# Patient Record
Sex: MALE | Race: WHITE | NOT HISPANIC OR LATINO | Employment: OTHER | URBAN - METROPOLITAN AREA
[De-identification: names, ages, dates, MRNs, and addresses within clinical notes are randomized per-mention and may not be internally consistent; named-entity substitution may affect disease eponyms.]

---

## 2017-11-15 ENCOUNTER — HOSPITAL ENCOUNTER (OUTPATIENT)
Dept: SPEECH THERAPY | Facility: HOSPITAL | Age: 72
Setting detail: THERAPIES SERIES
Discharge: HOME | End: 2017-11-15

## 2017-11-15 ENCOUNTER — HOSPITAL ENCOUNTER (OUTPATIENT)
Dept: PHYSICAL THERAPY | Facility: HOSPITAL | Age: 72
Setting detail: THERAPIES SERIES
Discharge: HOME | End: 2017-11-15

## 2017-11-15 DIAGNOSIS — I69.919 COGNITIVE DEFICITS AS LATE EFFECT OF CEREBROVASCULAR DISEASE: Primary | ICD-10-CM

## 2017-11-15 DIAGNOSIS — I63.9 CEREBROVASCULAR ACCIDENT (CVA), UNSPECIFIED MECHANISM (HCC): Primary | ICD-10-CM

## 2017-11-15 PROCEDURE — 96125 COGNITIVE TEST BY HC PRO: CPT

## 2017-11-15 PROCEDURE — G8978 MOBILITY CURRENT STATUS: HCPCS | Performed by: PHYSICAL THERAPIST

## 2017-11-15 PROCEDURE — G9170 MEMORY D/C STATUS: HCPCS

## 2017-11-15 PROCEDURE — G8979 MOBILITY GOAL STATUS: HCPCS | Performed by: PHYSICAL THERAPIST

## 2017-11-15 PROCEDURE — G9169 MEMORY GOAL STATUS: HCPCS

## 2017-11-15 PROCEDURE — G9168 MEMORY CURRENT STATUS: HCPCS

## 2017-11-15 PROCEDURE — 97162 PT EVAL MOD COMPLEX 30 MIN: CPT | Performed by: PHYSICAL THERAPIST

## 2017-11-15 NOTE — THERAPY EVALUATION
Outpatient Physical Therapy Ortho Initial Evaluation  SUSHANT Carrera     Patient Name: Blas Hairston  : 1945  MRN: [id number]  Today's Date: 11/15/2017      Visit Date: 11/15/2017    There is no problem list on file for this patient.       Past Medical History:   Diagnosis Date   • Coronary artery disease 10/25/2017    Left internal carotid artery stenosis 70%; Right internal carotid artery stenosis 50%   • Diabetes mellitus    • Gout 10/25/2017    LLE   • Hyperlipidemia 10/25/2017   • Hypertension    • PAD (peripheral artery disease)     with bilateral illiac stents   • Polyp of ethmoid sinus 10/25/2017    Ethmoid/sphenoid soft tissue mass; evaluated w/biopsy by [facility] per patient; f/u with ENT to follow as OP   • Stroke 10/25/2017    Superior left cerebellar infarct        Past Surgical History:   Procedure Laterality Date   • VASCULAR SURGERY Bilateral     illiac stents       Visit Dx:     ICD-10-CM ICD-9-CM   1. Cerebrovascular accident (CVA), unspecified mechanism I63.9 434.91             Patient History       11/15/17 0900          History    Chief Complaint Balance Problems  -SP      Date Current Problem(s) Began 10/25/17  -SP      Brief Description of Current Complaint 10-; Patient reports that he woke and had headache behind left eye and he went back to bed.  When he got up he observed slurred speech and had some difficulty with gait.  He initially went to [facility] then to [facility], and from there went to [facility] on 2017 to 2017 for rehab.  Patient has been home with spouse and he reports that he is doing well, using SPC for community ambulation only.   Patient reports that he feels that he is getting stronger and he has had no falls at home.  He states that he has to be cautious with intial stand.  Patient states he is doing exercises at home prescribed at [facility].    -SP      Previous treatment for THIS PROBLEM Rehabilitation  -SP      Onset Date- PT  11/15/2017 outpatient PT  -SP      Patient/Caregiver Goals Return to prior level of function  -SP      Smoking Status has not smoked since [facility] 10-  -SP      Patient's Rating of General Health Good  -SP      Hand Dominance right-handed  -SP      Occupation/sports/leisure activities retired, lives with spouse in single level home; can enter through 1 or 2/3 step entrance.  Enjoys yard work, mowing   -SP      How has patient tried to help current problem? patient has been performing HEP given at [facility]  -SP      Pain     Pain Location --   no complaints of pain  -SP      What Performance Factors Make the Current Problem(s) WORSE? Patient with mild unsteadiness with transfers to stand; some difficulty tolerating prolonged weight bearing and ambulation  -SP      What Performance Factors Make the Current Problem(s) BETTER? Patient reports he is cautious with mobility especially on unlevel surfaces and uses a cane for safety  -SP      Tolerance Time- Standing tolerates standing for showers but reports he fatiques and plans to obtain a shower seat  -SP      Tolerance Time- Sitting unlimited  -SP      Tolerance Time- Walking tolerates household distances and short community distances  -SP      Is your sleep disturbed? No  -SP      Difficulties with ADL's? Patient with some difficulty tolerating prolonged stand for shower; some difficulty with balance upon immediate stand and with prolonged ambulation  -SP      Fall Risk Assessment    Any falls in the past year: No  -SP      Daily Activities    Primary Language English  -SP      How does patient learn best? Listening;Pictures/Video  -SP      Teaching needs identified Management of Condition;Home Exercise Program;Falls Prevention  -SP      Patient is concerned about/has problems with Performing home management (household chores, shopping, care of dependents);Performing job responsibilities/community activities (work, school,;Walking;Transfers (getting out of a chair,  bed)  -SP      Does patient have problems with the following? None  -SP      Barriers to learning None  -SP      Pt Participated in POC and Goals Yes  -SP      Safety    Are you being hurt, hit, or frightened by anyone at home or in your life? No  -SP      Are you being neglected by a caregiver No  -SP        User Key  (r) = Recorded By, (t) = Taken By, (c) = Cosigned By    Initials Name Provider Type    SP Gissell Bryson, PT Physical Therapist                PT Ortho       11/15/17 0900    Left Hip    Hip Flexion Gross Movement (4-/5) good minus  -SP    Hip Extension Gross Movement (3+/5) fair plus  -SP    Hip ABduction Gross Movement (4-/5) good minus  -SP    Hip ADduction Gross Movement (4-/5) good minus  -SP    Right Hip    Hip Flexion Gross Movement (4/5) good  -SP    Hip Extension Gross Movement (4/5) good  -SP    Hip ABduction Gross Movement (4/5) good  -SP    Hip ADduction Gross Movement (4/5) good  -SP    Left Knee    Knee Extension Gross Movement (4-/5) good minus  -SP    Knee Flexion Gross Movement (4-/5) good minus  -SP    Right Knee    Knee Extension Gross Movement (5/5) normal  -SP    Knee Flexion Gross Movement (5/5) normal  -SP    Left Ankle/Foot    Ankle PF Gross Movement (3+/5) fair plus  -SP    Ankle Dorsiflexion Gross Movement (4-/5) good minus  -SP    Right Ankle/Foot    Ankle PF Gross Movement (4/5) good  -SP    Ankle Dorsiflexion Gross Movement (4/5) good  -SP    Flexibility    Flexibility Tested? Lower Extremity  -SP    Lower Extremity Flexibility    Hamstrings Moderately limited;Bilateral:  -SP    Hip Flexors Bilateral:;Moderately limited  -SP    Hip External Rotators Bilateral:;Moderately limited  -SP    Hip Internal Rotators Bilateral:;Moderately limited  -SP    Balance Skills Training    Sitting-Level of Assistance Independent  -SP    Standing-Level of Assistance Independent  -SP    Gait Balance-Level of Assistance Independent  -SP    Gait Balance Support assistive device  -SP     "Gait Balance Activities backwards;side-stepping;stepping over object   patient requires hold to support  -SP    [place] patient able to stand with feet together for 30 seconds with eyes open and closed: Able to stand with feet in \"step\" partial tandem for 9 seconds (with both right foot and left foot forward) with eyes open:  Unable to stand with feet in tandem with eyes closed without support.  -SP    Balance Comments Patient with mild unsteadiness observed upon initial stand  -SP    Transfers    Transfers, Sit-Stand [place] independent   uses UEs to assist  -SP    Transfer, Comment Patient with independent transfers supine/sit  -SP    Gait Assessment/Treatment    Gait, [place] Level independent  -SP    Gait, Assistive Device straight cane  -SP    Gait, Gait Pattern Analysis swing-through gait  -SP    Gait, Gait Deviations elian decreased;decreased heel strike;forward flexed posture;leans to the right;step length decreased;toe-to-floor clearance decreased   intermittent decreased toe/floor clearance on (L)  -SP    Gait, Safety Issues balance decreased during turns;step length decreased  -SP    Gait, Impairments impaired balance  -SP    Stairs Assessment/Treatment    Stairs, [place] Level supervision required  -SP    Stairs, Assistive Device straight cane   or (B) rails  -SP    Stairs, Technique Used --   intermittant step to/step over with ascend and descend  -SP    Stairs, Comment requires (B) UE support for safety  -SP      User Key  (r) = Recorded By, (t) = Taken By, (c) = Cosigned By    Initials Name Provider Type    SP Gissell Bryson, PT Physical Therapist                PT Neuro       11/15/17 0900          Pain Assessment    Pain Assessment No/denies pain  -SP      Home Living    Living Arrangements house  -SP      Home Accessibility bed and bath on same level;stairs to enter home  -SP      Number of Stairs to Enter Home --   2 entrances: single step and 2-3 step  -SP      Living " Environment Comment --   Pt states he is only using cane for community ambulation  -SP      Vision-Basic Assessment    Current Vision No visual deficits  -SP      Cognition    Overall Cognitive Status WFL  -SP      Comments Patient assessed by speech therapist and does not require further treatment   -SP      Sensation    Sensation WNL? WFL  -SP      Perception    Perception WNL? WFL  -SP      Posture/Observations    Forward Head Mild  -SP      Scapular Elevation Bilateral:;Mild  -SP      Lumbar lordosis Decreased  -SP      Posture/Observations Comments stands with decreased weight bearing on left LE  -SP      Coordination    Rapid Alternating Left:;Impaired   with forearm sup/forearm and toe taps: mild deficits  -SP      Finger to Nose Eyes Open Left:;Impaired   mild  -SP      Finger to Nose Eyes Closed Left:;Impaired   mild, slow   -SP      Dexterity Left:;Impaired   mild impairement with heel to shin  -SP      General UE Assessment    ROM LUE ROM was WFL   (R) shoulder flexion/abd limited: 130 degrees; otherwis wfl  -SP      General LE Assessment    ROM LLE ROM was WFL;RLE ROM was WFL  -SP      Trunk    Trunk Flexion Gross Movement (3-/5) fair minus  -SP      Left Trunk Rotat Int & Ext Abd Obliques (2/5) poor  -SP      Right Trunk Rotat Int & Ext Abd Obliques (2/5) poor  -SP      Gait Assessment/Treatment    Gait, Comment ambulate serpentine/around obstacles with wide deviation and step to pattern, with SPC and CGA to SBA.  Ambulates over obstacles and step without rail with SPC and CGA with some discontinuity but without LOB  -SP        User Key  (r) = Recorded By, (t) = Taken By, (c) = Cosigned By    Initials Name Provider Type    SP Gissell Bryson, PT Physical Therapist                        Therapy Education       11/15/17 3069          Therapy Education    Education Details Discussed safety with gait and use of assistive device appropriate for situation and surfaces.   Discussed plan of treatment  with patient.  -SP      Program New  -SP      How Provided Verbal  -SP      Provided to Patient  -SP      Level of Understanding Verbalized  -SP        User Key  (r) = Recorded By, (t) = Taken By, (c) = Cosigned By    Initials Name Provider Type    MARIAJOSE Bryson, PT Physical Therapist                PT OP Goals       11/15/17 0900       PT Short Term Goals    STG 1 Patient demonstrates no unsteadiness upon initial stand without assistive device with >50%% of transfers during treament session  -SP     STG 2 Patient ambulates stairs with hold to single rail safely with recipriocal gait patten   -SP     STG 3 Patient ambulates level surfaces without least restrictive device with equal step length and heel strike without deviations from path.  -SP     STG 4 Patient negociates serpentine path/obstacles safely and independently without significant compensations or deviation from path  -SP     Long Term Goals    LTG 1 Patient demonstrates improved transfer and gait skills/decreased fall risk with decreased timed up and go test to <18 sec  -SP     LTG 2 Patient demonstrates safe and independent ambulation with least restrictive device on level and unlevel surface with equal weight bearing, no deviations or compensation > 500 feet  -SP     LTG 3 Patient independent with HEP  -SP     Time Calculation    PT Goal Re-Cert Due Date 12/15/17  -SP       User Key  (r) = Recorded By, (t) = Taken By, (c) = Cosigned By    Initials Name Provider Type    MARIAJOSE Bryson, PT Physical Therapist                PT Assessment/Plan       11/15/17 1713 11/15/17 1654    PT Assessment    Assessment Comments  Patient is s/p CVA 10- with residual left side weakness.  He presents with weakness, balance deficits, altered gait, and decreased tolerance for ADLs.  -SP    Please refer to paper survey for additional self-reported information  Yes  -SP    Rehab Potential  Good  -SP    Patient/caregiver participated in  establishment of treatment plan and goals Yes  -SP     Patient would benefit from skilled therapy intervention  Yes  -SP    PT Plan    PT Frequency  2x/week  -SP    Predicted Duration of Therapy Intervention (days/wks) 4-6 weeks  -SP 4-6 weeks  -SP    Planned CPT's? PT EVAL MOD COMPLELITY: 79794;PT THER PROC EA 15 MIN: 53730;PT MANUAL THERAPY EA 15 MIN: 57807;PT GAIT TRAINING EA 15 MIN: 39075;PT NEUROMUSC RE-EDUCATION EA 15 MIN: 35323;PT ELECTRICAL STIM UNATTEND:   -SP       11/15/17 1627       PT Assessment    Functional Limitations Impaired gait;Decreased safety during functional activities;Impaired locomotion;Limitation in home management;Limitations in community activities;Limitations in functional capacity and performance  -SP     Impairments Balance;Coordination;Endurance;Gait;Muscle strength  -SP       User Key  (r) = Recorded By, (t) = Taken By, (c) = Cosigned By    Initials Name Provider Type    SP Gissell Bryson, PT Physical Therapist                                    Outcome Measures       11/15/17 1500          Lower Extremity Functional Index    Any of your usual work, housework or school activities 2  -SP      Your usual hobbies, recreational or sporting activities 3  -SP      Getting into or out of the bath 2  -SP      Walking between rooms 3  -SP      Putting on your shoes or socks 4  -SP      Squatting 4  -SP      Lifting an object, like a bag of groceries from the floor 4  -SP      Performing light activities around your home 3  -SP      Performing heavy activities around your home 1  -SP      Getting into or out of a car 3  -SP      Walking 2 blocks 2  -SP      Walking a mile 2  -SP      Going up or down 10 stairs (about 1 flight of stairs) 2  -SP      Standing for 1 hour 1  -SP      Sitting for 1 hour 1  -SP      Running on even ground 1  -SP      Running on uneven ground 1  -SP      Making sharp turns while running fast 1  -SP      Hopping 0  -SP      Rolling over in bed 4  -SP    "   Total 44  -SP      Tinetti Assessment    Sitting Balance 1  -SP      Arises 1  -SP      Attempts to Rise 2  -SP      Immediate Standing Balance (first 5 sec) 0  -SP      Standing Balance 1  -SP      Sternal Nudge (feet close together) 2  -SP      Eyes Closed (feet close together) 1  -SP      Turning 360 Degrees- Steps 0  -SP      Turning 360 Degrees- Steadiness 1  -SP      Sitting Down 1  -SP      Tinetti Balance Score 10  -SP      Gait Initiation (immediate after told \"go\") 1  -SP      Step Length- Right Swing 1  -SP      Step Length- Left Swing 1  -SP      Foot Clearance- Right Foot 1  -SP      Foot Clearance- Left Foot 0   inconsistent  -SP      Step Symmetry 0  -SP      Step Continuity 1  -SP      Path (excursion) 1  -SP      Trunk 0  -SP      Base of Support 0  -SP      Gait Score 6  -SP      Tinetti Total Score 16  -SP      Tinetti Assistive Device straight cane  -SP      Timed Up and Go (TUG)    TUG Test 1 19.68 seconds   without AD  -SP      TUG Test 2 20.98 seconds   without assistive device  -SP      Timed Up and Go Comments 20.46/ 20.14 sec   with SPC  -SP        User Key  (r) = Recorded By, (t) = Taken By, (c) = Cosigned By    Initials Name Provider Type    SP Gissell Bryson, PT Physical Therapist            Time Calculation:   Start Time: 0930  Stop Time: 1045  Time Calculation (min): 75 min     Therapy Charges for Today     Code Description Service Date Service Provider Modifiers Qty    63896091620 HC PT MOBILITY CURRENT 11/15/2017 Gissell Bryson, PT GP, CL 1    14264459319 HC PT MOBILITY PROJECTED 11/15/2017 Gissell Bryson, PT GP, CI 1    54475348574 HC PT EVAL MOD COMPLEXITY 4 11/15/2017 Gissell Bryson, PT GP 1          PT G-Codes  PT Professional Judgement Used?: Yes  Outcome Measure Options: Lower Extremity Functional Scale (LEFS), Timed Up and Go (TUG), Tinetti  Mobility: Walking and Moving Around Current Status (): At least 60 percent but less than 80 " percent impaired, limited or restricted  Mobility: Walking and Moving Around Goal Status (): At least 1 percent but less than 20 percent impaired, limited or restricted         Gissell Bryson, PT  11/15/2017

## 2017-11-15 NOTE — THERAPY DISCHARGE NOTE
Outpatient Speech Language Pathology   Adult Speech Language Cognitive Eval/Discharge   Stephanie Barrett     Patient Name: Blas Hairston  : 1945  MRN: [id number]  Today's Date: 11/15/2017         Visit Date: 11/15/2017    There is no problem list on file for this patient.       Past Medical History:   Diagnosis Date   • Coronary artery disease 10/25/2017    Left internal carotid artery stenosis 70%; Right internal carotid artery stenosis 50%   • Diabetes mellitus    • Gout 10/25/2017    LLE   • Hyperlipidemia 10/25/2017   • Hypertension    • PAD (peripheral artery disease)     with bilateral illiac stents   • Polyp of ethmoid sinus 10/25/2017    Ethmoid/sphenoid soft tissue mass; evaluated w/biopsy by [facility] per patient; f/u with ENT to follow as OP   • Stroke 10/25/2017    Superior left cerebellar infarct        Past Surgical History:   Procedure Laterality Date   • VASCULAR SURGERY Bilateral     illiac stents         Visit Dx:    ICD-10-CM ICD-9-CM   1. Cognitive deficits as late effect of cerebrovascular disease I69.919 438.0             Adult Speech Language - 11/15/17 1100     Background and History    Reason for Referral Mild cognitive deficits following left cerebellar CVA on 10/25/17.   -AD    Stated Goals Pt does not have any goals at this time regarding his cognition and memory.   -AD    Description of Complaint Pt states he doesn't feel he is having any issues related to his thinking or memory. He states they recommended the therapy at [facility] and he felt it was part of the program and inagreement with it.   -AD    Previous Functional Status Functional in all spheres   per report from patient and his wife.   -AD    Current Baseline Abilities Pt reports he is taking care of all things at home except the bills as his wife has always taken care of this prior to this CVA.   -AD    Pertinent Medications clopidogrel 75 mg once daily  -AD    Primary Language in the Home English  -AD    Primary Caregiver  Spouse;Other (comment)   self  -AD    Informant for the Evaluation Self  -AD    CLQT (The Cognitive Linguistic Quick Test)    Attention Domain Score 179  -AD    Attention Severity Rating 4: WNL  -AD    Memory Domain Score 175  -AD    Memory Severity Rating 4: WNL  -AD    Executive Function Domain Score 20  -AD    Executive Function Severity Rating 4: WNL  -AD    Language Domain Score 32  -AD    Language Severity Rating 4: WNL  -AD    Visuospatial Domain Score 78  -AD    Visuospatial Severity Rating 4: WNL  -AD    Clock Drawing Total Score 11  -AD    Clock Drawing Severity Rating WNL  -AD    Composite Severity Rating 4  -AD    Composite Severity Rating Range 4.0 - 3.5: WNL  -AD    CLQT Comments Mr. Hairston demonstrates cognitive skills within normal limits as compared to his same age peers of 69-89 years of age. He demonstrated some minimal errors on the exam related to recognition of errors (only re: time on the clock draw relating to the time), self corrections on the symbol trail subtest, increased time needed to complete the mazes felt to be due to decreased planning, and design generation with his criterion score lower than his same age peers (4 with criterion at 5) due to decreased planning and mental flexibility. Mr. Hairston demonstrates awareness of his errors and will correct. He demonstrates the need for increased time to perform activities and falls within the functional range for his current cognitive abilities. No concerns have been noted at home [facility] and he feels as if he is at his baseline functioning at this time.   -AD      User Key  (r) = Recorded By, (t) = Taken By, (c) = Cosigned By    Initials Name Provider Type    KADEEM Coelho MS [facility]-SLP Speech Therapist      No concerns with motor speech or receptive/expressive language skills. WFL per clinical observation.          OP SLP Education       11/15/17 1033    Education    Barriers to Learning No barriers  identified  -AD    Education Provided Described results of evaluation;Patient expressed understanding of evaluation  -AD    Assessed Learning needs;Learning motivation;Learning preferences;Learning readiness  -AD    Learning Motivation Strong  -AD    Learning Method Explanation;Demonstration  -AD    Teaching Response Verbalized understanding  -AD    Education Comments Pt provided with information for cognitive exercises to be performed at home. SLP suggested 30 minutes per day practice with varied activities to prevent cognitive decline. Pt verbalized understandig.   -AD      User Key  (r) = Recorded By, (t) = Taken By, (c) = Cosigned By    Initials Name Effective Dates    KADEEM Coelho MS CCC-SLP 06/22/16 -               OP SLP Assessment/Plan - 11/15/17 1145     SLP Assessment    Clinical Impression: Speech Language-Adult/Congnition Speech Language WFL;Cognitive Communication WFL  -AD    Please refer to paper survey for additional self-reported information Yes  -AD    Please refer to items scanned into chart for additional diagnostic informaiton and handouts as provided by clinician Yes  -AD    SLP Diagnosis Functional cognitive communication skills  -AD    Prognosis Good (comment)   For return to prior level of functioning without further intervention at this time.   -AD    Patient/caregiver participated in establishment of treatment plan and goals Yes   Agree that no further therapy is indicated at this time.   -AD    Patient would benefit from skilled therapy intervention No  -AD    SLP Plan    Frequency Evaluation only  -AD    Planned CPT's? SLP COG TEST (PER HOUR): 06377  -AD    Plan Comments No further therapy is indicated at this time.   -AD      User Key  (r) = Recorded By, (t) = Taken By, (c) = Cosigned By    Initials Name Provider Type    KADEEM Coelho MS CCC-SLP Speech Therapist         Time Calculation:   SLP Start Time: 0830  SLP Stop Time: 0945 (this includes scoring and  interpretation time.)  SLP Time Calculation (min): 75 min  SLP Non-Billable Time (min): 0 min  Total Timed Code Minutes- SLP: 75 minute(s)    Therapy Charges for Today     Code Description Service Date Service Provider Modifiers Qty    38303537178 HC ST MEMORY CURRENT 11/15/2017 Yvonne Coelho, MS CCC-SLP GN, CH 1    03808389921 HC ST MEMORY PROJECTED 11/15/2017 Yvonne Coelho MS CCC-SLP GN, CH 1    28219505399 HC ST MEMORY DISCHARGE 11/15/2017 Yvonne Coelho, MS CCC-SLP GN, CH 1    03038181615 HC ST STD COG PERF TEST PER HOUR 11/15/2017 Yvonne Coelho MS CCC-SLP GN 1          SLP G-Codes  SLP NOMS Used?: Yes  Functional Limitations: Memory  Memory Current Status (): 0 percent impaired, limited or restricted  Memory Goal Status (): 0 percent impaired, limited or restricted  Memory Discharge Status (): 0 percent impaired, limited or restricted    Discharge from therapy with no further follow up.     Yvonne Coelho MS CCC-SLP  11/15/2017

## 2017-11-21 ENCOUNTER — HOSPITAL ENCOUNTER (OUTPATIENT)
Dept: PHYSICAL THERAPY | Facility: HOSPITAL | Age: 72
Setting detail: THERAPIES SERIES
Discharge: HOME | End: 2017-11-21

## 2017-11-21 PROCEDURE — 97110 THERAPEUTIC EXERCISES: CPT | Performed by: PHYSICAL THERAPIST

## 2017-11-21 NOTE — THERAPY TREATMENT NOTE
Outpatient Physical Therapy Ortho Treatment Note  SUSHANT Carrera     Patient Name: Blas Hairston  : 1945  MRN: [id number]  Today's Date: 2017      Visit Date: 2017    Visit Dx:  No diagnosis found.    There is no problem list on file for this patient.       Past Medical History:   Diagnosis Date   • Coronary artery disease 10/25/2017    Left internal carotid artery stenosis 70%; Right internal carotid artery stenosis 50%   • Diabetes mellitus    • Gout 10/25/2017    LLE   • Hyperlipidemia 10/25/2017   • Hypertension    • PAD (peripheral artery disease)     with bilateral illiac stents   • Polyp of ethmoid sinus 10/25/2017    Ethmoid/sphenoid soft tissue mass; evaluated w/biopsy by [facility] per patient; f/u with ENT to follow as OP   • Stroke 10/25/2017    Superior left cerebellar infarct        Past Surgical History:   Procedure Laterality Date   • VASCULAR SURGERY Bilateral     illiac stents             PT Ortho       17 1100    Subjective Comments    Subjective Comments Patient reports that he has been doing exercises as previously prescribed at home and continues to feel like he is doing well  -SP      User Key  (r) = Recorded By, (t) = Taken By, (c) = Cosigned By    Initials Name Provider Type    MARIAJOSE Bryson, PT Physical Therapist                            PT Assessment/Plan       17 6513       PT Assessment    Assessment Comments Patient tolerates ther-ex and balance activity well requiring only one rest break.  Exhibits fatigue after Rx  -SP     PT Plan    PT Plan Comments Continue per POC  -SP       User Key  (r) = Recorded By, (t) = Taken By, (c) = Cosigned By    Initials Name Provider Type    MARIAJOSE Bryson, PT Physical Therapist                    Exercises       17 1100          Subjective Comments    Subjective Comments Patient reports that he has been doing exercises as previously prescribed at home and continues to feel like he is  "doing well  -SP      Exercise 1    Exercise Name 1 seated marches  -SP      Reps 1 25  -SP      Additional Comments 2# cuff weights  -SP      Exercise 2    Exercise Name 2 LAQ  -SP      Reps 2 25  -SP      Additional Comments 2# cuff weights  -SP      Exercise 3    Exercise Name 3 seated hip abduction vs tband  -SP      Reps 3 30  -SP      Additional Comments blue  -SP      Exercise 4    Exercise Name 4 seated hip adduction/ball squeeze  -SP      Reps 4 25  -SP      Time (Seconds) 4 3  -SP      Exercise 5    Exercise Name 5 HS curl seated  -SP      Reps 5 25  -SP      Additional Comments blue  -SP      Exercise 6    Exercise Name 6 step ups 6\"  -SP      Reps 6 20   each  -SP      Exercise 7    Exercise Name 7 resisted side step with hold to rail   -SP      Reps 7 3  -SP      Additional Comments yellow tband at ankles  -SP      Exercise 8    Exercise Name 8 airdyne  -SP      Time (Minutes) 8 5  -SP      Exercise 9    Exercise Name 9 balance on balance pad (one foot on step/one on pad)  -SP      Reps 9 3  -SP      Time (Seconds) 9 20   -SP      Exercise 10    Exercise Name 10 BAPS over foam roll/ rock side to side/ balance  -SP      Reps 10 30  -SP      Exercise 11    Exercise Name 11 seated on swiss ball marches  -SP      Reps 11 20  -SP        User Key  (r) = Recorded By, (t) = Taken By, (c) = Cosigned By    Initials Name Provider Type    MARIAJOSE Bryson, NOAH Physical Therapist                                   Therapy Education       11/21/17 4947          Therapy Education    Education Details reviewed HEP and progression for home, patient given blue theraband  -SP      Program Progressed  -SP      How Provided Verbal  -SP      Provided to Patient  -SP      Level of Understanding Verbalized  -SP        User Key  (r) = Recorded By, (t) = Taken By, (c) = Cosigned By    Initials Name Provider Type    MARIAJOSE Bryson, PT Physical Therapist                Time Calculation:   Start Time: 1100  Stop " Time: 1200  Time Calculation (min): 60 min    Therapy Charges for Today     Code Description Service Date Service Provider Modifiers Qty    35141267269 HC PT THER PROC EA 15 MIN 11/21/2017 Gissell Bryson, PT GP 2                    Gissell Bryson, PT  11/21/2017

## 2017-11-27 ENCOUNTER — APPOINTMENT (OUTPATIENT)
Dept: PHYSICAL THERAPY | Facility: HOSPITAL | Age: 72
End: 2017-11-27

## 2017-11-29 ENCOUNTER — HOSPITAL ENCOUNTER (OUTPATIENT)
Dept: PHYSICAL THERAPY | Facility: HOSPITAL | Age: 72
Setting detail: THERAPIES SERIES
Discharge: HOME | End: 2017-11-29

## 2017-11-29 DIAGNOSIS — I63.9 CEREBROVASCULAR ACCIDENT (CVA), UNSPECIFIED MECHANISM (HCC): Primary | ICD-10-CM

## 2017-11-29 PROCEDURE — 97110 THERAPEUTIC EXERCISES: CPT | Performed by: PHYSICAL THERAPIST

## 2017-11-29 NOTE — THERAPY TREATMENT NOTE
Outpatient Physical Therapy Ortho Treatment Note  SUSHANT Carrera     Patient Name: Blas Hairston  : 1945  MRN: [id number]  Today's Date: 2017      Visit Date: 2017    Visit Dx:    ICD-10-CM ICD-9-CM   1. Cerebrovascular accident (CVA), unspecified mechanism I63.9 434.91       There is no problem list on file for this patient.       Past Medical History:   Diagnosis Date   • Coronary artery disease 10/25/2017    Left internal carotid artery stenosis 70%; Right internal carotid artery stenosis 50%   • Diabetes mellitus    • Gout 10/25/2017    LLE   • Hyperlipidemia 10/25/2017   • Hypertension    • PAD (peripheral artery disease)     with bilateral illiac stents   • Polyp of ethmoid sinus 10/25/2017    Ethmoid/sphenoid soft tissue mass; evaluated w/biopsy by [facility] per patient; f/u with ENT to follow as OP   • Stroke 10/25/2017    Superior left cerebellar infarct        Past Surgical History:   Procedure Laterality Date   • VASCULAR SURGERY Bilateral     illiac stents             PT Ortho       17 1030    Subjective Comments    Subjective Comments Patient reports that he is doing well and has been doing exerices at home.  He states he has had no falls or LOB  -SP      User Key  (r) = Recorded By, (t) = Taken By, (c) = Cosigned By    Initials Name Provider Type    MARIAJOSE Bryson, PT Physical Therapist                            PT Assessment/Plan       17 1504       PT Assessment    Assessment Comments Patient continues to have some difficutly with clearing of left foot but corrects well with cues to heel strike and clear foot.  -SP     PT Plan    PT Plan Comments Continue per POC  -SP       User Key  (r) = Recorded By, (t) = Taken By, (c) = Cosigned By    Initials Name Provider Type    MARIAJOSE Bryson, PT Physical Therapist                    Exercises       17 1030          Subjective Comments    Subjective Comments Patient reports that he is doing well  "and has been doing exerices at home.  He states he has had no falls or LOB  -SP      Exercise 1    Exercise Name 1 step ups 6\"  -SP      Reps 1 20  -SP      Exercise 2    Exercise Name 2 heel raises  -SP      Reps 2 20  -SP      Exercise 3    Exercise Name 3 resisted side step  -SP      Reps 3 4  -SP      Additional Comments yellow (long hallway)  -SP      Exercise 4    Exercise Name 4 cycle (airdyne)  -SP      Time (Minutes) 4 8  -SP      Exercise 5    Exercise Name 5 partial single leg stand on balance pad (1 foot on step)  -SP      Reps 5 3   each  -SP      Time (Seconds) 5 20 sec  -SP      Exercise 6    Exercise Name 6 : rock side to side balance  -SP      Reps 6 30  -SP      Exercise 7    Exercise Name 7 stand on ivan disc: balance  -SP      Time (Minutes) 7 2 min  -SP      Exercise 8    Exercise Name 8 matrix lunges  -SP      Reps 8 4  -SP        User Key  (r) = Recorded By, (t) = Taken By, (c) = Cosigned By    Initials Name Provider Type    SP Gissell Bryson, PT Physical Therapist                                       Time Calculation:   Start Time: 1030  Stop Time: 1130  Time Calculation (min): 60 min    Therapy Charges for Today     Code Description Service Date Service Provider Modifiers Qty    45766560133 HC PT HOT OR COLD PACK TREAT MCARE 11/29/2017 Gissell Bryson, PT GP 1    85157934242 HC PT THER PROC EA 15 MIN 11/29/2017 Gissell Bryson, PT GP 1                    Gissell Bryson, PT  11/29/2017       "

## 2017-12-06 ENCOUNTER — HOSPITAL ENCOUNTER (OUTPATIENT)
Dept: PHYSICAL THERAPY | Facility: HOSPITAL | Age: 72
Setting detail: THERAPIES SERIES
Discharge: HOME | End: 2017-12-06

## 2017-12-06 DIAGNOSIS — I63.9 CEREBROVASCULAR ACCIDENT (CVA), UNSPECIFIED MECHANISM (HCC): Primary | ICD-10-CM

## 2017-12-06 PROCEDURE — 97110 THERAPEUTIC EXERCISES: CPT | Performed by: PHYSICAL THERAPIST

## 2017-12-06 NOTE — THERAPY TREATMENT NOTE
"    Outpatient Physical Therapy Ortho Treatment Note  SUSHANT Carrera     Patient Name: Blas Hairston  : 1945  MRN: [id number]  Today's Date: 2017      Visit Date: 2017    Visit Dx:    ICD-10-CM ICD-9-CM   1. Cerebrovascular accident (CVA), unspecified mechanism I63.9 434.91       There is no problem list on file for this patient.       Past Medical History:   Diagnosis Date   • Coronary artery disease 10/25/2017    Left internal carotid artery stenosis 70%; Right internal carotid artery stenosis 50%   • Diabetes mellitus    • Gout 10/25/2017    LLE   • Hyperlipidemia 10/25/2017   • Hypertension    • PAD (peripheral artery disease)     with bilateral illiac stents   • Polyp of ethmoid sinus 10/25/2017    Ethmoid/sphenoid soft tissue mass; evaluated w/biopsy by [facility] per patient; f/u with ENT to follow as OP   • Stroke 10/25/2017    Superior left cerebellar infarct        Past Surgical History:   Procedure Laterality Date   • VASCULAR SURGERY Bilateral     illiac stents                             PT Assessment/Plan       17 1330       PT Assessment    Assessment Comments Patient tolerates prolonged weight bearing activity.  He continues to exhibit some balance deficits with higher level balance exercises.  Patient also exhibits decreased left foot clearance with fatigue.  -SP     PT Plan    PT Plan Comments Continue to progress higher level balance and gait activity  -SP       User Key  (r) = Recorded By, (t) = Taken By, (c) = Cosigned By    Initials Name Provider Type    SP Gissell Bryson, PT Physical Therapist                    Exercises       17 1015          Subjective Comments    Subjective Comments Patient reports that he is doing well.  He is anxious to begin driving but knows that physician must release him.  He reports that he is feeling better overall and has not had any falls or near falls  -SP      Exercise 1    Exercise Name 1 step ups 6\"  -SP      Reps 1 20  -SP "      Exercise 2    Exercise Name 2 heel raises  -SP      Reps 2 25  -SP      Exercise 3    Exercise Name 3 resisted side step  -SP      Reps 3 4  -SP      Additional Comments yellow  (long abraham  -SP      Exercise 4    Exercise Name 4 cycle (airdyne)  -SP      Time (Minutes) 4 8  -SP      Exercise 5    Exercise Name 5 partial single leg stand on balance pad (1 foot on step)  -SP      Reps 5 3   each  -SP      Time (Seconds) 5 20 sec  -SP      Exercise 6    Exercise Name 6 Leidy disc: rock side to side  -SP      Reps 6 30  -SP      Exercise 7    Exercise Name 7 stand on leidy disc: balance  -SP      Time (Minutes) 7 2 min  -SP      Exercise 8    Exercise Name 8 matrix lunges  -SP      Reps 8 5  -SP      Exercise 9    Exercise Name 9 tandem walk on blue tape  -SP      Reps 9 3  -SP      Exercise 10    Exercise Name 10 box steps  -SP      Reps 10 5   each  -SP        User Key  (r) = Recorded By, (t) = Taken By, (c) = Cosigned By    Initials Name Provider Type    SP Gissell Bryson, PT Physical Therapist                                       Time Calculation:   Start Time: 1015  Stop Time: 1115  Time Calculation (min): 60 min    Therapy Charges for Today     Code Description Service Date Service Provider Modifiers Qty    52325096851 HC PT THER PROC EA 15 MIN 12/6/2017 Gissell Bryson, PT GP 2                    Gissell Bryson, PT  12/6/2017

## 2017-12-13 ENCOUNTER — HOSPITAL ENCOUNTER (OUTPATIENT)
Dept: PHYSICAL THERAPY | Facility: HOSPITAL | Age: 72
Setting detail: THERAPIES SERIES
Discharge: HOME | End: 2017-12-13

## 2017-12-13 DIAGNOSIS — I63.9 CEREBROVASCULAR ACCIDENT (CVA), UNSPECIFIED MECHANISM (HCC): Primary | ICD-10-CM

## 2017-12-13 PROCEDURE — 97110 THERAPEUTIC EXERCISES: CPT | Performed by: PHYSICAL THERAPIST

## 2017-12-13 NOTE — THERAPY TREATMENT NOTE
Outpatient Physical Therapy Ortho Treatment Note  SUSHANT Carrera     Patient Name: Blas Hairston  : 1945  MRN: [id number]  Today's Date: 2017      Visit Date: 2017    Visit Dx:    ICD-10-CM ICD-9-CM   1. Cerebrovascular accident (CVA), unspecified mechanism I63.9 434.91       There is no problem list on file for this patient.       Past Medical History:   Diagnosis Date   • Coronary artery disease 10/25/2017    Left internal carotid artery stenosis 70%; Right internal carotid artery stenosis 50%   • Diabetes mellitus    • Gout 10/25/2017    LLE   • Hyperlipidemia 10/25/2017   • Hypertension    • PAD (peripheral artery disease)     with bilateral illiac stents   • Polyp of ethmoid sinus 10/25/2017    Ethmoid/sphenoid soft tissue mass; evaluated w/biopsy by [facility] per patient; f/u with ENT to follow as OP   • Stroke 10/25/2017    Superior left cerebellar infarct        Past Surgical History:   Procedure Laterality Date   • VASCULAR SURGERY Bilateral     illiac stents             PT Ortho       17 1000    Subjective Comments    Subjective Comments Patient reports that he is doing well and has not had any loss of balance.  -SP      User Key  (r) = Recorded By, (t) = Taken By, (c) = Cosigned By    Initials Name Provider Type    MARIAJOSE Bryson, PT Physical Therapist                            PT Assessment/Plan       17 1306       PT Assessment    Assessment Comments Patient tolerates ther-ex well with less rest break required.  Patient continues to have decreased left foot clearance with fatigue  -SP     PT Plan    PT Plan Comments Re-evaluation next visit  -SP       User Key  (r) = Recorded By, (t) = Taken By, (c) = Cosigned By    Initials Name Provider Type    MARIAJOSE Bryson, PT Physical Therapist                    Exercises       17 1000          Subjective Comments    Subjective Comments Patient reports that he is doing well and has not had any loss  "of balance.  -SP      Exercise 1    Exercise Name 1 step ups 6\"  -SP      Reps 1 20  -SP      Exercise 2    Exercise Name 2 heel raises  -SP      Reps 2 25  -SP      Exercise 3    Exercise Name 3 resisted side step  -SP      Reps 3 4  -SP      Exercise 4    Exercise Name 4 cycle (airdyne)  -SP      Time (Minutes) 4 8  -SP      Exercise 5    Exercise Name 5 partial single leg stand on balance pad (1 foot on step)  -SP      Reps 5 3   each  -SP      Time (Seconds) 5 20 sec  -SP      Exercise 6    Exercise Name 6 Leidy disc: rock side to side  -SP      Reps 6 30  -SP      Exercise 7    Exercise Name 7 stand on leidy disc: balance  -SP      Time (Minutes) 7 2 min  -SP      Exercise 8    Exercise Name 8 matrix lunges  -SP      Reps 8 5  -SP      Exercise 9    Exercise Name 9 tandem walk on blue tape  -SP      Reps 9 3  -SP      Exercise 10    Exercise Name 10 box steps  -SP      Reps 10 5   each  -SP        User Key  (r) = Recorded By, (t) = Taken By, (c) = Cosigned By    Initials Name Provider Type    SP Gissell Bryson, PT Physical Therapist                                            Time Calculation:   Start Time: 1000  Stop Time: 1105  Time Calculation (min): 65 min    Therapy Charges for Today     Code Description Service Date Service Provider Modifiers Qty    83588108324 HC PT THER PROC EA 15 MIN 12/13/2017 Gissell Bryson, PT GP 3                    Gissell Bryson, PT  12/13/2017       "

## 2017-12-20 ENCOUNTER — APPOINTMENT (OUTPATIENT)
Dept: PHYSICAL THERAPY | Facility: HOSPITAL | Age: 72
End: 2017-12-20

## 2017-12-27 ENCOUNTER — APPOINTMENT (OUTPATIENT)
Dept: PHYSICAL THERAPY | Facility: HOSPITAL | Age: 72
End: 2017-12-27

## 2018-07-11 ENCOUNTER — DOCUMENTATION (OUTPATIENT)
Dept: PHYSICAL THERAPY | Facility: HOSPITAL | Age: 73
End: 2018-07-11

## 2018-07-11 NOTE — THERAPY DISCHARGE NOTE
Outpatient Physical Therapy Rehab Program Treatment/Discharge       Patient Name: Blas Hairston  : 1945  MRN: [id number]  Today's Date: 2018      Visit Date: 2018    Visit Dx:  No diagnosis found.    There is no problem list on file for this patient.       Past Medical History:   Diagnosis Date   • Coronary artery disease 10/25/2017    Left internal carotid artery stenosis 70%; Right internal carotid artery stenosis 50%   • Diabetes mellitus (CMS/[facility])    • Gout 10/25/2017    LLE   • Hyperlipidemia 10/25/2017   • Hypertension    • PAD (peripheral artery disease) (CMS/[facility])     with bilateral illiac stents   • Polyp of ethmoid sinus 10/25/2017    Ethmoid/sphenoid soft tissue mass; evaluated w/biopsy by [facility] per patient; f/u with ENT to follow as OP   • Stroke (CMS/HCC) 10/25/2017    Superior left cerebellar infarct        Past Surgical History:   Procedure Laterality Date   • VASCULAR SURGERY Bilateral     illiac stents                                                          PT OP Goals     Row Name 18 0849          PT Short Term Goals    STG 1 Patient demonstrates no unsteadiness upon initial stand without assistive device with >50%% of transfers during treament session  -SP     STG 1 Progress Met  -SP     STG 2 Patient ambulates stairs with hold to single rail safely with recipriocal gait patten   -SP     STG 2 Progress Met  -SP     STG 3 Patient ambulates level surfaces without least restrictive device with equal step length and heel strike without deviations from path.  -SP     STG 3 Progress Met  -SP     STG 4 Patient negociates serpentine path/obstacles safely and independently without significant compensations or deviation from path  -SP     STG 4 Progress Met  -SP        Long Term Goals    LTG 1 Patient demonstrates improved transfer and gait skills/decreased fall risk with decreased timed up and go test to <18 sec  -SP     LTG 1 Progress Met  -SP     LTG 2 Patient  demonstrates safe and independent ambulation with least restrictive device on level and unlevel surface with equal weight bearing, no deviations or compensation > 500 feet  -SP     LTG 2 Progress Met  -SP     LTG 3 Patient independent with HEP  -SP     LTG 3 Progress Met  -SP       User Key  (r) = Recorded By, (t) = Taken By, (c) = Cosigned By    Initials Name Provider Type    SP Gissell Bryson, PT Physical Therapist                    Time Calculation:       Therapy Suggested Charges     Code   Minutes Charges    None                     OP PT Discharge Summary  Date of Discharge: 07/11/18  Reason for Discharge: All goals achieved  Outcomes Achieved: Able to achieve all goals within established timeline  Discharge Destination: Home with home program      Gissell Bryson, PT  7/11/2018

## 2018-10-05 ENCOUNTER — TELEPHONE (OUTPATIENT)
Dept: GASTROENTEROLOGY | Facility: CLINIC | Age: 73
End: 2018-10-05

## 2018-10-05 NOTE — TELEPHONE ENCOUNTER
FAST TRACK (IN MEDIA) - LAST COLON 10/2015 - PERSONAL HX POLYPS - [facility].  ON PLAVIX FOR STROKE  WILL CALL FOR CLEARANCE.

## 2018-10-09 ENCOUNTER — PREP FOR SURGERY (OUTPATIENT)
Dept: OTHER | Facility: HOSPITAL | Age: 73
End: 2018-10-09

## 2018-10-09 DIAGNOSIS — D12.6 ADENOMATOUS POLYP OF COLON, UNSPECIFIED PART OF COLON: Primary | ICD-10-CM

## 2018-10-16 ENCOUNTER — TELEPHONE (OUTPATIENT)
Dept: GASTROENTEROLOGY | Facility: CLINIC | Age: 73
End: 2018-10-16

## 2018-10-16 PROBLEM — D12.6 ADENOMATOUS POLYP OF COLON: Status: ACTIVE | Noted: 2018-10-16

## 2018-10-17 NOTE — TELEPHONE ENCOUNTER
SCHEDULED [facility] 01/11/2019 AT 11AM - ARRIVE 10AM.  WILL MAIL INSTRUCTIONS.  ON PLAVIX, WILL CALL FOR CLEARANCE.

## 2019-01-10 ENCOUNTER — ANESTHESIA EVENT (OUTPATIENT)
Dept: PERIOP | Facility: HOSPITAL | Age: 74
End: 2019-01-10

## 2019-01-10 RX ORDER — GLIMEPIRIDE 4 MG/1
4 TABLET ORAL
COMMUNITY

## 2019-01-10 RX ORDER — CLOPIDOGREL BISULFATE 75 MG/1
75 TABLET ORAL DAILY
COMMUNITY

## 2019-01-10 RX ORDER — CHOLECALCIFEROL (VITAMIN D3) 125 MCG
CAPSULE ORAL DAILY
COMMUNITY

## 2019-01-10 RX ORDER — ASPIRIN 81 MG/1
81 TABLET, CHEWABLE ORAL DAILY
COMMUNITY
End: 2021-09-04

## 2019-01-10 RX ORDER — LOSARTAN POTASSIUM 50 MG/1
50 TABLET ORAL 2 TIMES DAILY
COMMUNITY

## 2019-01-10 RX ORDER — PRAVASTATIN SODIUM 20 MG
20 TABLET ORAL DAILY
COMMUNITY

## 2019-01-10 RX ORDER — FERROUS SULFATE 325(65) MG
325 TABLET ORAL
COMMUNITY

## 2019-01-10 RX ORDER — ALLOPURINOL 100 MG/1
100 TABLET ORAL DAILY
COMMUNITY

## 2019-01-11 ENCOUNTER — ANESTHESIA (OUTPATIENT)
Dept: PERIOP | Facility: HOSPITAL | Age: 74
End: 2019-01-11

## 2019-01-11 ENCOUNTER — HOSPITAL ENCOUNTER (OUTPATIENT)
Facility: HOSPITAL | Age: 74
Setting detail: HOSPITAL OUTPATIENT SURGERY
Discharge: HOME | End: 2019-01-11
Attending: INTERNAL MEDICINE | Admitting: INTERNAL MEDICINE

## 2019-01-11 VITALS
SYSTOLIC BLOOD PRESSURE: 154 MMHG | RESPIRATION RATE: 16 BRPM | OXYGEN SATURATION: 97 % | HEIGHT: 70 IN | HEART RATE: 71 BPM | BODY MASS INDEX: 28.89 KG/M2 | WEIGHT: 201.8 LBS | TEMPERATURE: 97.1 F | DIASTOLIC BLOOD PRESSURE: 77 MMHG

## 2019-01-11 DIAGNOSIS — D12.6 ADENOMATOUS POLYP OF COLON, UNSPECIFIED PART OF COLON: ICD-10-CM

## 2019-01-11 LAB — GLUCOSE BLDC GLUCOMTR-MCNC: 223 MG/DL (ref 70–130)

## 2019-01-11 PROCEDURE — 45380 COLONOSCOPY AND BIOPSY: CPT | Performed by: INTERNAL MEDICINE

## 2019-01-11 PROCEDURE — 93005 ELECTROCARDIOGRAM TRACING: CPT | Performed by: NURSE ANESTHETIST, CERTIFIED REGISTERED

## 2019-01-11 PROCEDURE — 25010000002 PROPOFOL 10 MG/ML EMULSION: Performed by: NURSE ANESTHETIST, CERTIFIED REGISTERED

## 2019-01-11 PROCEDURE — 93010 ELECTROCARDIOGRAM REPORT: CPT | Performed by: INTERNAL MEDICINE

## 2019-01-11 PROCEDURE — 88305 TISSUE EXAM BY PATHOLOGIST: CPT | Performed by: INTERNAL MEDICINE

## 2019-01-11 PROCEDURE — 82962 GLUCOSE BLOOD TEST: CPT

## 2019-01-11 RX ORDER — LIDOCAINE HYDROCHLORIDE 20 MG/ML
INJECTION, SOLUTION INFILTRATION; PERINEURAL AS NEEDED
Status: DISCONTINUED | OUTPATIENT
Start: 2019-01-11 | End: 2019-01-11 | Stop reason: SURG

## 2019-01-11 RX ORDER — SODIUM CHLORIDE, SODIUM LACTATE, POTASSIUM CHLORIDE, CALCIUM CHLORIDE 600; 310; 30; 20 MG/100ML; MG/100ML; MG/100ML; MG/100ML
9 INJECTION, SOLUTION INTRAVENOUS CONTINUOUS PRN
Status: DISCONTINUED | OUTPATIENT
Start: 2019-01-11 | End: 2019-01-11 | Stop reason: HOSPADM

## 2019-01-11 RX ORDER — SODIUM CHLORIDE 9 MG/ML
40 INJECTION, SOLUTION INTRAVENOUS AS NEEDED
Status: DISCONTINUED | OUTPATIENT
Start: 2019-01-11 | End: 2019-01-11 | Stop reason: HOSPADM

## 2019-01-11 RX ORDER — LIDOCAINE HYDROCHLORIDE 10 MG/ML
0.5 INJECTION, SOLUTION EPIDURAL; INFILTRATION; INTRACAUDAL; PERINEURAL ONCE AS NEEDED
Status: DISCONTINUED | OUTPATIENT
Start: 2019-01-11 | End: 2019-01-11 | Stop reason: HOSPADM

## 2019-01-11 RX ORDER — PROPOFOL 10 MG/ML
VIAL (ML) INTRAVENOUS AS NEEDED
Status: DISCONTINUED | OUTPATIENT
Start: 2019-01-11 | End: 2019-01-11 | Stop reason: SURG

## 2019-01-11 RX ORDER — MAGNESIUM HYDROXIDE 1200 MG/15ML
LIQUID ORAL AS NEEDED
Status: DISCONTINUED | OUTPATIENT
Start: 2019-01-11 | End: 2019-01-11 | Stop reason: HOSPADM

## 2019-01-11 RX ORDER — SODIUM CHLORIDE 0.9 % (FLUSH) 0.9 %
3 SYRINGE (ML) INJECTION EVERY 12 HOURS SCHEDULED
Status: DISCONTINUED | OUTPATIENT
Start: 2019-01-11 | End: 2019-01-11 | Stop reason: HOSPADM

## 2019-01-11 RX ORDER — ONDANSETRON 2 MG/ML
4 INJECTION INTRAMUSCULAR; INTRAVENOUS ONCE AS NEEDED
Status: CANCELLED | OUTPATIENT
Start: 2019-01-11

## 2019-01-11 RX ORDER — SODIUM CHLORIDE 0.9 % (FLUSH) 0.9 %
1-10 SYRINGE (ML) INJECTION AS NEEDED
Status: DISCONTINUED | OUTPATIENT
Start: 2019-01-11 | End: 2019-01-11 | Stop reason: HOSPADM

## 2019-01-11 RX ORDER — SODIUM CHLORIDE, SODIUM LACTATE, POTASSIUM CHLORIDE, CALCIUM CHLORIDE 600; 310; 30; 20 MG/100ML; MG/100ML; MG/100ML; MG/100ML
100 INJECTION, SOLUTION INTRAVENOUS CONTINUOUS
Status: CANCELLED | OUTPATIENT
Start: 2019-01-11

## 2019-01-11 RX ADMIN — PROPOFOL 40 MG: 10 INJECTION, EMULSION INTRAVENOUS at 12:06

## 2019-01-11 RX ADMIN — SODIUM CHLORIDE, POTASSIUM CHLORIDE, SODIUM LACTATE AND CALCIUM CHLORIDE: 600; 310; 30; 20 INJECTION, SOLUTION INTRAVENOUS at 11:12

## 2019-01-11 RX ADMIN — SODIUM CHLORIDE, POTASSIUM CHLORIDE, SODIUM LACTATE AND CALCIUM CHLORIDE: 600; 310; 30; 20 INJECTION, SOLUTION INTRAVENOUS at 11:07

## 2019-01-11 RX ADMIN — PROPOFOL 50 MG: 10 INJECTION, EMULSION INTRAVENOUS at 12:03

## 2019-01-11 RX ADMIN — PROPOFOL 40 MG: 10 INJECTION, EMULSION INTRAVENOUS at 12:13

## 2019-01-11 RX ADMIN — PROPOFOL 40 MG: 10 INJECTION, EMULSION INTRAVENOUS at 12:29

## 2019-01-11 RX ADMIN — SODIUM CHLORIDE, POTASSIUM CHLORIDE, SODIUM LACTATE AND CALCIUM CHLORIDE 9 ML/HR: 600; 310; 30; 20 INJECTION, SOLUTION INTRAVENOUS at 10:30

## 2019-01-11 RX ADMIN — PROPOFOL 40 MG: 10 INJECTION, EMULSION INTRAVENOUS at 12:23

## 2019-01-11 RX ADMIN — PROPOFOL 40 MG: 10 INJECTION, EMULSION INTRAVENOUS at 12:26

## 2019-01-11 RX ADMIN — PROPOFOL 40 MG: 10 INJECTION, EMULSION INTRAVENOUS at 12:16

## 2019-01-11 RX ADMIN — PROPOFOL 40 MG: 10 INJECTION, EMULSION INTRAVENOUS at 12:19

## 2019-01-11 RX ADMIN — PROPOFOL 40 MG: 10 INJECTION, EMULSION INTRAVENOUS at 12:10

## 2019-01-11 RX ADMIN — LIDOCAINE HYDROCHLORIDE 100 MG: 20 INJECTION, SOLUTION INFILTRATION; PERINEURAL at 12:03

## 2019-01-11 NOTE — ANESTHESIA POSTPROCEDURE EVALUATION
Patient: Blas Hairston    Procedure Summary     Date:  01/11/19 Room / Location:  [facility] ENDOSCOPY 1 /  [facility]    Anesthesia Start:  1156 Anesthesia Stop:  1236    Procedure:  COLONOSCOPY with polypectomy (N/A ) Diagnosis:       Adenomatous polyp of colon, unspecified part of colon      Colon polyp      Diverticulosis      (Adenomatous polyp of colon, unspecified part of colon [D12.6])    Surgeon:  Lamin Thomas MD Provider:  Teresa Beckman CRNA    Anesthesia Type:  MAC ASA Status:  3          Anesthesia Type: MAC  Last vitals  BP   118/49 (01/11/19 1249)   Temp   97.1 °F (36.2 °C) (01/11/19 1239)   Pulse   90 (01/11/19 1249)   Resp   16 (01/11/19 1249)     SpO2   98 % (01/11/19 1249)     Post Anesthesia Care and Evaluation    Patient location during evaluation: PHASE II  Patient participation: complete - patient participated  Level of consciousness: awake and alert  Pain score: 0  Pain management: adequate  Airway patency: patent  Anesthetic complications: No anesthetic complications  PONV Status: none  Cardiovascular status: acceptable  Respiratory status: acceptable  Hydration status: acceptable

## 2019-01-11 NOTE — OP NOTE
COLONOSCOPY  Procedure Report    Patient Name:  Blas Hairston  YOB: 1945    Date of Surgery:  1/11/2019     Indications:  Personal History Of Colon Polyps    Pre-op Diagnosis:   Adenomatous polyp of colon, unspecified part of colon [D12.6]    Post-Op Diagnosis Codes:     * Adenomatous polyp of colon, unspecified part of colon [D12.6]     * Colon polyp [K63.5]     * Diverticulosis [K57.90]         Procedure/CPT® Codes:      Procedure(s):  COLONOSCOPY with polypectomy    Staff:  Surgeon(s):  Lamin Thomas MD         Anesthesia: Monitor Anesthesia Care    Estimated Blood Loss: none    Specimens:   ID Type Source Tests Collected by Time   A : sigmoid polyps X 2 Polyp Large Intestine, Sigmoid Colon TISSUE PATHOLOGY EXAM Lamin Thomas MD 1/11/2019 1211   B : cecal polyp Polyp Large Intestine, Cecum TISSUE PATHOLOGY EXAM Lamin Thomas MD 1/11/2019 1225   C : rectal polyp Polyp Large Intestine, Rectum TISSUE PATHOLOGY EXAM Lamin Thomas MD 1/11/2019 1233       Implants:    Nothing was implanted during the procedure      Description of Procedure: After having signed informed consent, he was brought to the endoscopy suite and placed in left lateral decubitus position, given his IV sedation. Rectal exam revealed no external lesions, normal anal tone, mildly enlarged prostate without nodules. Scope was introduced into rectum and advanced under direct visualization through the rectum into the sigmoid colon. There were multiple diverticular openings noted. These involved the entire colon. Within the sigmoid colon, however, there was a sessile 5 x 7 mm polyp that was biopsied, felt to be completely removed, sent as a sigmoid colon polyp. Scope was advanced through the sigmoid with significant difficulty due to the multiple diverticular openings. Some formed stool had to be circumnavigated. Scope was advanced through the remainder of the descending colon, to  and around the splenic flexure, reduced, advanced through the transverse colon with significant looping. Scope was reduced using abdominal pressure. Scope was advanced around the hepatic flexure, through the ascending colon, reduced again and then advanced eventually into the cecum. Cecum was identified by appendiceal orifice. The ileocecal valve was not intubated. It was identified. Scope was withdrawn from the cecum where there was sessile diminutive polyp noted, biopsied, sent separately as cecal polyp, back into the ascending colon, withdrawn slow. I examined the colon in circumferential fashion. There were no mucosal lesions noted throughout the ascending, transverse, or descending colon. Within the sigmoid colon, a 2nd diminutive polyp was identified, biopsied, felt to be completely removed. The preparation of the colon overall was poor. Besides the formed stool in the sigmoid colon, there was opaque liquid stool with particulate matter through the colon. Scope was withdrawn from the sigmoid colon where a 2nd again diminutive polyp was removed with cold biopsy forceps, sent as sigmoid colon polyps x2. Within the rectum, there was a sessile 8 mm polyp noted there. It was removed with cold biopsy forceps with excellent hemostasis, sent separately as a rectal polyp. Scope was taken from the patient, tolerated procedure very well.          Findings: Colon to Cecum Poor Prep  Severe pan Diverticulosis  Polyps (4) Cold Biopsy      Complications: None    Recommendations: Results to be called  Repeat in 3 years with 1.5 day Prep      Lamin Thomas MD     Date: 1/11/2019  Time: 12:39 PM

## 2019-01-11 NOTE — BRIEF OP NOTE
COLONOSCOPY  Progress Note    Blas Haisrton  1/11/2019    Pre-op Diagnosis:   Adenomatous polyp of colon, unspecified part of colon [D12.6]       Post-Op Diagnosis Codes:     * Adenomatous polyp of colon, unspecified part of colon [D12.6]     * Colon polyp [K63.5]     * Diverticulosis [K57.90]    Procedure/CPT® Codes:      Procedure(s):  COLONOSCOPY with polypectomy    Surgeon(s):  Lamin Thomas MD    Anesthesia: Monitor Anesthesia Care    Staff:   Circulator: Maddie Camacho RN; Anushka Sierra RN  Scrub Person: Raina Montalvo    Estimated Blood Loss: none    Urine Voided: * No values recorded between 1/11/2019 11:56 AM and 1/11/2019 12:34 PM *    Specimens:                ID Type Source Tests Collected by Time   A : sigmoid polyps X 2 Polyp Large Intestine, Sigmoid Colon TISSUE PATHOLOGY EXAM Lamin Thomas MD 1/11/2019 1211   B : cecal polyp Polyp Large Intestine, Cecum TISSUE PATHOLOGY EXAM Lamin Thomas MD 1/11/2019 1225   C : rectal polyp Polyp Large Intestine, Rectum TISSUE PATHOLOGY EXAM Lamin Thomas MD 1/11/2019 1233         Drains:      Findings: Colon to Cecum Poor Prep  Severe pan Diverticulosis  Polyps (4) Cold Biopsy      Complications: None      Lamin Thomas MD     Date: 1/11/2019  Time: 12:36 PM

## 2019-01-11 NOTE — H&P
Patient Care Team:  Anand Garcia APRN as PCP - General  Anand Garcia APRN as PCP - Family Medicine    CHIEF COMPLAINT: Previous Polyps    HISTORY OF PRESENT ILLNESS:    Last exam       Past Medical History:   Diagnosis Date   • Coronary artery disease 10/25/2017    Left internal carotid artery stenosis 70%; Right internal carotid artery stenosis 50%   • Diabetes mellitus (CMS/HCC)    • Gout 10/25/2017    LLE   • Hyperlipidemia 10/25/2017   • Hypertension    • PAD (peripheral artery disease) (CMS/HCC)     with bilateral illiac stents   • Polyp of ethmoid sinus 10/25/2017    Ethmoid/sphenoid soft tissue mass; evaluated w/biopsy by Good per patient; f/u with ENT to follow as OP   • Stroke (CMS/HCC) 10/25/2017    Superior left cerebellar infarct     Past Surgical History:   Procedure Laterality Date   • COLONOSCOPY     • HEMORROIDECTOMY     • ROTATOR CUFF REPAIR Right    • VASCULAR SURGERY Bilateral     illiac stents     History reviewed. No pertinent family history.  Social History     Tobacco Use   • Smoking status: Former Smoker     Last attempt to quit: 10/25/2017     Years since quittin.2   Substance Use Topics   • Alcohol use: Yes     Comment: RARELY   • Drug use: No     Medications Prior to Admission   Medication Sig Dispense Refill Last Dose   • allopurinol (ZYLOPRIM) 100 MG tablet Take 100 mg by mouth Daily.   2019 at 0600   • aspirin 81 MG chewable tablet Chew 81 mg Daily.   2019   • Cholecalciferol (VITAMIN D3) 2000 units tablet Take  by mouth Daily.   1/10/2019 at 0900   • CINNAMON PO Take  by mouth Daily.   1/10/2019 at 0900   • clopidogrel (PLAVIX) 75 MG tablet Take 75 mg by mouth Daily.   2019   • ferrous sulfate 325 (65 FE) MG tablet Take 325 mg by mouth Daily With Breakfast.   1/10/2019 at 0900   • glimepiride (AMARYL) 4 MG tablet Take 4 mg by mouth Every Morning Before Breakfast.   1/10/2019 at 0900   • losartan (COZAAR) 50 MG tablet Take 50 mg by mouth 2 (Two) Times a Day.  "  1/11/2019 at 0600   • metFORMIN (GLUCOPHAGE) 1000 MG tablet Take 1,000 mg by mouth Daily With Breakfast.   1/10/2019 at 0900   • pravastatin (PRAVACHOL) 20 MG tablet Take 20 mg by mouth Daily.   1/11/2019 at 0600     Allergies:  Lisinopril    REVIEW OF SYSTEMS:  Please see the above history of present illness for pertinent positives and negatives.  The remainder of the patient's systems have been reviewed and are negative.     Vital Signs  Temp:  [96.9 °F (36.1 °C)-98.6 °F (37 °C)] 96.9 °F (36.1 °C)  Heart Rate:  [94] 94  Resp:  [16] 16  BP: (185)/() 185/93    Flowsheet Rows      First Filed Value   Admission Height  177.8 cm (70\") Documented at 01/11/2019 1017   Admission Weight  91.5 kg (201 lb 12.8 oz) Documented at 01/11/2019 1004           Physical Exam:  Physical Exam   Constitutional: Patient appears well-developed and well-nourished and in no acute distress   HEENT:   Head: Normocephalic and atraumatic.   Eyes:  Pupils are equal, round, and reactive to light. EOM are intact. Sclerae are anicteric and non-injected.  Mouth and Throat: Patient has moist mucous membranes. Oropharynx is clear of any erythema or exudate.     Neck: Neck supple. No JVD present. No thyromegaly present. No lymphadenopathy present.  Cardiovascular: Regular rate, regular rhythm, S1 normal and S2 normal.  Exam reveals no gallop and no friction rub.  No murmur heard.  Pulmonary/Chest: Lungs are clear to auscultation bilaterally. No respiratory distress. No wheezes. No rhonchi. No rales.   Abdominal: Soft. Bowel sounds are normal. No distension and no mass. There is no hepatosplenomegaly. There is no tenderness.   Musculoskeletal: Normal Muscle tone  Extremities: No edema. Pulses are palpable in all 4 extremities.  Neurological: Patient is alert and oriented to person, place, and time. Cranial nerves II-XII are grossly intact with no focal deficits.  Skin: Skin is warm. No rash noted. Nails show no clubbing.  No cyanosis or " erythema.    Debilities/Disabilities Identified: None  Emotional Behavior: Appropriate     Results Review:    I reviewed the patient's new clinical results.  Lab Results (most recent)     Procedure Component Value Units Date/Time    POC Glucose Once [[id number]]  (Abnormal) Collected:  01/11/19 1030    Specimen:  Blood Updated:  01/11/19 1045     Glucose 223 mg/dL           Imaging Results (most recent)     None        reviewed    ECG/EMG Results (most recent)     Procedure Component Value Units Date/Time    ECG 12 Lead [[id number]] Collected:  01/11/19 1059     Updated:  01/11/19 1102    Narrative:       RR Interval= 759 ms  ID Interval= 193 ms  QRSD Interval= 101 ms  QT Interval= 403 ms  QTc Interval= 463 ms  Heart Rate= 79 ms  P Axis= 53 deg  QRS Axis= -6 deg  T Wave Axis= 56 deg  I: 40 Axis= 19 deg  T: 40 Axis= 250 deg  ST Axis= 62 deg  Sinus rhythm  Minimal ST elevation, anterior leads  Electronically Signed by:  Date and Time of Study: 2019-01-11 10:59:40        reviewed    Assessment/Plan     Personal history of colon polyps/ Colonoscopy    I discussed the patients findings and my recommendations with patient.     Lamin Thomas MD  01/11/19  11:50 AM    Time: 10 min prior to procedure.

## 2019-01-14 LAB
CYTO UR: NORMAL
LAB AP CASE REPORT: NORMAL
PATH REPORT.FINAL DX SPEC: NORMAL
PATH REPORT.GROSS SPEC: NORMAL

## 2021-02-16 NOTE — ANESTHESIA PREPROCEDURE EVALUATION
Anesthesia Evaluation     Patient summary reviewed and Nursing notes reviewed   no history of anesthetic complications:  NPO Solid Status: > 8 hours  NPO Liquid Status: > 8 hours           Airway   Mallampati: II  TM distance: >3 FB  Possible difficult intubation and Narrow palate  Dental    (+) partials and upper dentures    Pulmonary - negative pulmonary ROS and normal exam     ROS comment: Polyp of ethmoid sinus  Cardiovascular - normal exam  Exercise tolerance: good (4-7 METS)    Rhythm: regular  Rate: normal    (+) hypertension well controlled, CAD, PVD (with bilateral illiac stents), hyperlipidemia,  carotid artery disease (Left internal carotid artery stenosis 70%; Right internal carotid artery stenosis 50%) carotid bilateral      Neuro/Psych  (+) CVA (10/17 Superior left cerebellar infarct),     GI/Hepatic/Renal/Endo    (+)   diabetes mellitus type 2 well controlled,     Musculoskeletal     Abdominal  - normal exam   Substance History   (+) alcohol use,      OB/GYN          Other   (+) arthritis (Gout)                     Anesthesia Plan    ASA 3     MAC     Anesthetic plan, all risks, benefits, and alternatives have been provided, discussed and informed consent has been obtained with: patient.  Use of blood products discussed with patient  Consented to blood products.      3/mm

## 2021-09-06 ENCOUNTER — TELEPHONE (OUTPATIENT)
Dept: URGENT CARE | Facility: CLINIC | Age: 76
End: 2021-09-06

## 2021-09-06 NOTE — TELEPHONE ENCOUNTER
TRIED CALLING [facility] RADIOLOGY DEPT MULTIPLE TIMES ON 9/4/21 AND 9/5/21, BUT PHONE WOULD JUST RING AND RING WITH NO ANSWER, ALSO  STATED THAT THEY DID NOT HAVE A RADIOLOGIST THERE ON Saturday TO READ THE X-RAY. AND THEY TRIED TO GET SOMEONE TO HELP ME BUT COULD NOT GET ANYONE TO ANSWER THE PHONE IN RADIOLOGY.

## 2021-09-06 NOTE — TELEPHONE ENCOUNTER
Was informed today that a patient was sent to [facility] radiology yesterday for xray with an order for our office. Per Johnna Stewart she tried calling several times ewsterday but could not get anyone to answer or return her calls. I called [facility] 3 times this morning trying to get someone to answer the phone to obtain the report but could not get an answer. I did leave a voice message for a return call. I am waiting to see if I get a return call.

## 2021-09-07 ENCOUNTER — TELEPHONE (OUTPATIENT)
Dept: URGENT CARE | Facility: CLINIC | Age: 76
End: 2021-09-07

## 2021-09-07 NOTE — TELEPHONE ENCOUNTER
----- Message from JOEL Norwood sent at 9/7/2021  8:51 AM EDT -----  Please inform pt XR was negative (no fracture, dislocation, etc). Continue rest, ice, and wear splint x 3-4 more days. Follow up with primary care. Thanks!

## 2021-09-07 NOTE — TELEPHONE ENCOUNTER
Patient returned call and was given xray results and to continue rest, ice and splint for 3 to 4 more days and to follow up with pcp.

## (undated) DEVICE — MASK,FACE,FLUID RESIST,SHLD,EARLOOP: Brand: MEDLINE

## (undated) DEVICE — SYR LL 3CC

## (undated) DEVICE — SPNG GZ WOVN 4X4IN 12PLY 10/BX STRL

## (undated) DEVICE — SUCTION CANISTER, 3000CC,SAFELINER: Brand: DEROYAL

## (undated) DEVICE — Device: Brand: DEFENDO AIR/WATER/SUCTION AND BIOPSY VALVE

## (undated) DEVICE — BW-412T DISP COMBO CLEANING BRUSH: Brand: SINGLE USE COMBINATION CLEANING BRUSH

## (undated) DEVICE — VIAL FORMALIN CAP 10P 40ML

## (undated) DEVICE — ENDO. PORT CONNECTOR W/VALVE FOR OLYMPUS® SCOPES: Brand: ERBE

## (undated) DEVICE — FRCP BX RADJAW4 NDL 2.8 240CM LG OG BX40

## (undated) DEVICE — JACKT LAB F/R KNIT CUFF/COLR XLG BLU

## (undated) DEVICE — GLV SURG SENSICARE MICRO PF LF 7.5 STRL

## (undated) DEVICE — Device

## (undated) DEVICE — GOWN ISOL W/THUMB UNIV BLU BX/15